# Patient Record
Sex: MALE
[De-identification: names, ages, dates, MRNs, and addresses within clinical notes are randomized per-mention and may not be internally consistent; named-entity substitution may affect disease eponyms.]

---

## 2017-06-21 PROBLEM — Z00.00 ENCOUNTER FOR PREVENTIVE HEALTH EXAMINATION: Status: ACTIVE | Noted: 2017-06-21

## 2017-06-27 ENCOUNTER — APPOINTMENT (OUTPATIENT)
Dept: SURGERY | Facility: CLINIC | Age: 57
End: 2017-06-27

## 2017-08-02 ENCOUNTER — INPATIENT (INPATIENT)
Facility: HOSPITAL | Age: 57
LOS: 1 days | Discharge: HOME | DRG: 473 | End: 2017-08-04
Attending: ORTHOPAEDIC SURGERY | Admitting: ORTHOPAEDIC SURGERY
Payer: MEDICAID

## 2017-08-02 VITALS
HEIGHT: 67 IN | DIASTOLIC BLOOD PRESSURE: 83 MMHG | RESPIRATION RATE: 20 BRPM | WEIGHT: 160.28 LBS | OXYGEN SATURATION: 99 % | HEART RATE: 62 BPM | SYSTOLIC BLOOD PRESSURE: 124 MMHG | TEMPERATURE: 97 F

## 2017-08-02 DIAGNOSIS — Z98.890 OTHER SPECIFIED POSTPROCEDURAL STATES: Chronic | ICD-10-CM

## 2017-08-02 DIAGNOSIS — Z90.49 ACQUIRED ABSENCE OF OTHER SPECIFIED PARTS OF DIGESTIVE TRACT: Chronic | ICD-10-CM

## 2017-08-02 RX ORDER — ACETAMINOPHEN 500 MG
650 TABLET ORAL EVERY 6 HOURS
Qty: 0 | Refills: 0 | Status: DISCONTINUED | OUTPATIENT
Start: 2017-08-02 | End: 2017-08-04

## 2017-08-02 RX ORDER — DOCUSATE SODIUM 100 MG
100 CAPSULE ORAL THREE TIMES A DAY
Qty: 0 | Refills: 0 | Status: DISCONTINUED | OUTPATIENT
Start: 2017-08-02 | End: 2017-08-04

## 2017-08-02 RX ORDER — VANCOMYCIN HCL 1 G
1000 VIAL (EA) INTRAVENOUS ONCE
Qty: 0 | Refills: 0 | Status: COMPLETED | OUTPATIENT
Start: 2017-08-03 | End: 2017-08-03

## 2017-08-02 RX ORDER — HYDROMORPHONE HYDROCHLORIDE 2 MG/ML
1 INJECTION INTRAMUSCULAR; INTRAVENOUS; SUBCUTANEOUS ONCE
Qty: 0 | Refills: 0 | Status: DISCONTINUED | OUTPATIENT
Start: 2017-08-02 | End: 2017-08-03

## 2017-08-02 RX ORDER — SENNA PLUS 8.6 MG/1
2 TABLET ORAL AT BEDTIME
Qty: 0 | Refills: 0 | Status: DISCONTINUED | OUTPATIENT
Start: 2017-08-02 | End: 2017-08-04

## 2017-08-02 RX ORDER — OXYCODONE AND ACETAMINOPHEN 5; 325 MG/1; MG/1
1 TABLET ORAL EVERY 4 HOURS
Qty: 0 | Refills: 0 | Status: DISCONTINUED | OUTPATIENT
Start: 2017-08-02 | End: 2017-08-04

## 2017-08-02 RX ORDER — ONDANSETRON 8 MG/1
4 TABLET, FILM COATED ORAL EVERY 6 HOURS
Qty: 0 | Refills: 0 | Status: DISCONTINUED | OUTPATIENT
Start: 2017-08-02 | End: 2017-08-04

## 2017-08-02 RX ORDER — MORPHINE SULFATE 50 MG/1
4 CAPSULE, EXTENDED RELEASE ORAL EVERY 4 HOURS
Qty: 0 | Refills: 0 | Status: DISCONTINUED | OUTPATIENT
Start: 2017-08-02 | End: 2017-08-03

## 2017-08-02 RX ORDER — MORPHINE SULFATE 50 MG/1
4 CAPSULE, EXTENDED RELEASE ORAL
Qty: 0 | Refills: 0 | Status: DISCONTINUED | OUTPATIENT
Start: 2017-08-02 | End: 2017-08-02

## 2017-08-02 RX ORDER — OXYCODONE AND ACETAMINOPHEN 5; 325 MG/1; MG/1
2 TABLET ORAL EVERY 6 HOURS
Qty: 0 | Refills: 0 | Status: DISCONTINUED | OUTPATIENT
Start: 2017-08-02 | End: 2017-08-04

## 2017-08-02 RX ORDER — SODIUM CHLORIDE 9 MG/ML
1000 INJECTION, SOLUTION INTRAVENOUS
Qty: 0 | Refills: 0 | Status: DISCONTINUED | OUTPATIENT
Start: 2017-08-02 | End: 2017-08-04

## 2017-08-02 RX ADMIN — MORPHINE SULFATE 4 MILLIGRAM(S): 50 CAPSULE, EXTENDED RELEASE ORAL at 23:23

## 2017-08-02 RX ADMIN — MORPHINE SULFATE 4 MILLIGRAM(S): 50 CAPSULE, EXTENDED RELEASE ORAL at 21:56

## 2017-08-02 RX ADMIN — MORPHINE SULFATE 4 MILLIGRAM(S): 50 CAPSULE, EXTENDED RELEASE ORAL at 21:47

## 2017-08-02 RX ADMIN — Medication 100 MILLIGRAM(S): at 23:23

## 2017-08-02 NOTE — ASU PATIENT PROFILE, ADULT - NS PRO AD PATIENT TYPE
Health Care Proxy (HCP)/daughter Leatha Sean [phone] mom basil sean [phone] Health Care Proxy (HCP)/daughter Jeffrey Estrada [phone] mom basil melody [phone]

## 2017-08-03 ENCOUNTER — TRANSCRIPTION ENCOUNTER (OUTPATIENT)
Age: 57
End: 2017-08-03

## 2017-08-03 DIAGNOSIS — M54.12 RADICULOPATHY, CERVICAL REGION: ICD-10-CM

## 2017-08-03 LAB
ANION GAP SERPL CALC-SCNC: 13 MMOL/L — SIGNIFICANT CHANGE UP (ref 5–17)
BASOPHILS NFR BLD AUTO: 0 % — SIGNIFICANT CHANGE UP (ref 0–2)
BUN SERPL-MCNC: 16 MG/DL — SIGNIFICANT CHANGE UP (ref 7–23)
CALCIUM SERPL-MCNC: 8.8 MG/DL — SIGNIFICANT CHANGE UP (ref 8.4–10.5)
CHLORIDE SERPL-SCNC: 99 MMOL/L — SIGNIFICANT CHANGE UP (ref 96–108)
CO2 SERPL-SCNC: 24 MMOL/L — SIGNIFICANT CHANGE UP (ref 22–31)
CREAT SERPL-MCNC: 0.8 MG/DL — SIGNIFICANT CHANGE UP (ref 0.5–1.3)
EOSINOPHIL NFR BLD AUTO: 0 % — SIGNIFICANT CHANGE UP (ref 0–6)
GLUCOSE SERPL-MCNC: 128 MG/DL — HIGH (ref 70–99)
HCT VFR BLD CALC: 36.6 % — LOW (ref 39–50)
HGB BLD-MCNC: 12 G/DL — LOW (ref 13–17)
LYMPHOCYTES # BLD AUTO: 3.4 % — LOW (ref 13–44)
MCHC RBC-ENTMCNC: 30.5 PG — SIGNIFICANT CHANGE UP (ref 27–34)
MCHC RBC-ENTMCNC: 32.8 G/DL — SIGNIFICANT CHANGE UP (ref 32–36)
MCV RBC AUTO: 92.9 FL — SIGNIFICANT CHANGE UP (ref 80–100)
MONOCYTES NFR BLD AUTO: 8.1 % — SIGNIFICANT CHANGE UP (ref 2–14)
NEUTROPHILS NFR BLD AUTO: 88.5 % — HIGH (ref 43–77)
PLATELET # BLD AUTO: 214 K/UL — SIGNIFICANT CHANGE UP (ref 150–400)
POTASSIUM SERPL-MCNC: 4.6 MMOL/L — SIGNIFICANT CHANGE UP (ref 3.5–5.3)
POTASSIUM SERPL-SCNC: 4.6 MMOL/L — SIGNIFICANT CHANGE UP (ref 3.5–5.3)
RBC # BLD: 3.94 M/UL — LOW (ref 4.2–5.8)
RBC # FLD: 14.5 % — SIGNIFICANT CHANGE UP (ref 10.3–16.9)
SODIUM SERPL-SCNC: 136 MMOL/L — SIGNIFICANT CHANGE UP (ref 135–145)
WBC # BLD: 11 K/UL — HIGH (ref 3.8–10.5)
WBC # FLD AUTO: 11 K/UL — HIGH (ref 3.8–10.5)

## 2017-08-03 RX ORDER — FAMOTIDINE 10 MG/ML
20 INJECTION INTRAVENOUS EVERY 12 HOURS
Qty: 0 | Refills: 0 | Status: DISCONTINUED | OUTPATIENT
Start: 2017-08-03 | End: 2017-08-04

## 2017-08-03 RX ORDER — HYDROMORPHONE HYDROCHLORIDE 2 MG/ML
1 INJECTION INTRAMUSCULAR; INTRAVENOUS; SUBCUTANEOUS ONCE
Qty: 0 | Refills: 0 | Status: CANCELLED | OUTPATIENT
Start: 2017-08-10 | End: 2017-08-04

## 2017-08-03 RX ORDER — MORPHINE SULFATE 50 MG/1
2 CAPSULE, EXTENDED RELEASE ORAL EVERY 6 HOURS
Qty: 0 | Refills: 0 | Status: DISCONTINUED | OUTPATIENT
Start: 2017-08-03 | End: 2017-08-04

## 2017-08-03 RX ORDER — DEXAMETHASONE 0.5 MG/5ML
10 ELIXIR ORAL EVERY 12 HOURS
Qty: 0 | Refills: 0 | Status: COMPLETED | OUTPATIENT
Start: 2017-08-03 | End: 2017-08-04

## 2017-08-03 RX ORDER — BENZOCAINE AND MENTHOL 5; 1 G/100ML; G/100ML
1 LIQUID ORAL
Qty: 0 | Refills: 0 | Status: DISCONTINUED | OUTPATIENT
Start: 2017-08-03 | End: 2017-08-04

## 2017-08-03 RX ADMIN — SODIUM CHLORIDE 120 MILLILITER(S): 9 INJECTION, SOLUTION INTRAVENOUS at 10:51

## 2017-08-03 RX ADMIN — FAMOTIDINE 20 MILLIGRAM(S): 10 INJECTION INTRAVENOUS at 05:14

## 2017-08-03 RX ADMIN — Medication 250 MILLIGRAM(S): at 02:35

## 2017-08-03 RX ADMIN — HYDROMORPHONE HYDROCHLORIDE 1 MILLIGRAM(S): 2 INJECTION INTRAMUSCULAR; INTRAVENOUS; SUBCUTANEOUS at 11:26

## 2017-08-03 RX ADMIN — MORPHINE SULFATE 2 MILLIGRAM(S): 50 CAPSULE, EXTENDED RELEASE ORAL at 17:58

## 2017-08-03 RX ADMIN — MORPHINE SULFATE 4 MILLIGRAM(S): 50 CAPSULE, EXTENDED RELEASE ORAL at 03:00

## 2017-08-03 RX ADMIN — MORPHINE SULFATE 102 MILLIGRAM(S): 50 CAPSULE, EXTENDED RELEASE ORAL at 02:42

## 2017-08-03 RX ADMIN — MORPHINE SULFATE 2 MILLIGRAM(S): 50 CAPSULE, EXTENDED RELEASE ORAL at 07:20

## 2017-08-03 RX ADMIN — Medication 102 MILLIGRAM(S): at 16:15

## 2017-08-03 RX ADMIN — HYDROMORPHONE HYDROCHLORIDE 1 MILLIGRAM(S): 2 INJECTION INTRAMUSCULAR; INTRAVENOUS; SUBCUTANEOUS at 12:11

## 2017-08-03 RX ADMIN — SODIUM CHLORIDE 120 MILLILITER(S): 9 INJECTION, SOLUTION INTRAVENOUS at 22:40

## 2017-08-03 RX ADMIN — Medication 100 MILLIGRAM(S): at 13:24

## 2017-08-03 RX ADMIN — MORPHINE SULFATE 2 MILLIGRAM(S): 50 CAPSULE, EXTENDED RELEASE ORAL at 07:35

## 2017-08-03 RX ADMIN — MORPHINE SULFATE 2 MILLIGRAM(S): 50 CAPSULE, EXTENDED RELEASE ORAL at 18:27

## 2017-08-03 NOTE — DISCHARGE NOTE ADULT - HOSPITAL COURSE
Admitted  Surgery 8/3/17: ACDF C3-C4, C6-C7  Esme-op Antibiotics: Vancomycin  Pain control  DVT prophylaxis  OOB/Physical Therapy

## 2017-08-03 NOTE — H&P ADULT - PROBLEM SELECTOR PLAN 1
Admit to Orthopaedic Service.  Elective ACDF C3-C4, C6-C7  Pt medically stable and cleared for procedure today by Admit to Orthopaedic Service.  Elective ACDF C3-C4, C6-C7  Pt medically stable and cleared for procedure by Dr. Antwon Sow MD

## 2017-08-03 NOTE — H&P ADULT - HISTORY OF PRESENT ILLNESS
56M c/o neck pain POD#1 s/p ACDF C3-C4, C6-C7. Pt reports tightness and swelling overlying incision site. Denies numbness, tingling, weakness of extremities. Ambulates without assistance at baseline. Denies DVT history. Denies fever, chills, CP, SOB, N/V today.     Elective ACDF C3-C4, C6-C7.

## 2017-08-03 NOTE — PROVIDER CONTACT NOTE (OTHER) - BACKGROUND
ACDF C3-6. Complaining of neck pain, neck swollen, restless, dressing is saturated with bloody drainage

## 2017-08-03 NOTE — DISCHARGE NOTE ADULT - ADDITIONAL INSTRUCTIONS
No strenuous activity (bending/twisting), heavy lifting, driving or returning to work until cleared by MD.  Change dressing daily with gauze/tape till post-op day #5, then leave incision open to air.  You may shower post-op day#5, keep incision clean and dry.   Try to have regular bowel movements, take stool softener or laxative if necessary.  May take pepcid or zantac for upset stomach.  May apply ice to affected areas to decrease swelling.  Call to schedule an appt with Dr. Mccrary for follow up, if you have staples or sutures they will be removed in office.  Contact your doctor if you experience: fever greater than 101.5, chills, chest pain, difficulty breathing, redness or excessive drainage around the incision, other concerns.     Please follow up with your primary care provider.

## 2017-08-03 NOTE — H&P ADULT - NSHPLABSRESULTS_GEN_ALL_CORE
Preop CBC, CMP, PT/PTT/INR, UA - WNL per medical clearance   Preop EKG - reviewed by per medical clearance

## 2017-08-03 NOTE — PROVIDER CONTACT NOTE (OTHER) - ACTION/TREATMENT ORDERED:
MD Stewart came to assess pt and is aware of the swollen and dressing on neck saturated. Medication to be given for muscle relaxant

## 2017-08-03 NOTE — H&P ADULT - NSHPPHYSICALEXAM_GEN_ALL_CORE
MSK:  +Decreased ROM secondary to pain, cervical spine  Wrist flex/ext intact. Motor intact to AIN/PIN/ulna.  strength intact bilateral UE.  Sensation intact and equal to bilateral UE distally.  Radial pulses palpable, fingers warm and well perfused, cap refill brisk.    Remainder of physical exam per medical clearance note.

## 2017-08-03 NOTE — PROGRESS NOTE ADULT - SUBJECTIVE AND OBJECTIVE BOX
POST OPERATIVE DAY #: 1  STATUS POST: ACDF C3-4, C6-7                        SUBJECTIVE: Patient seen and examined.     Pain:  well controlled      OBJECTIVE:     Vital Signs Last 24 Hrs  T(C): 36.1 (03 Aug 2017 09:32), Max: 36.3 (03 Aug 2017 00:20)  T(F): 97 (03 Aug 2017 09:32), Max: 97.3 (03 Aug 2017 00:20)  HR: 60 (03 Aug 2017 13:15) (54 - 70)  BP: 129/80 (03 Aug 2017 13:15) (102/78 - 146/82)  BP(mean): --  RR: 16 (03 Aug 2017 13:15) (16 - 22)  SpO2: 98% (03 Aug 2017 13:15) (95% - 100%)    PE: Neck: + swelling to anterior neck, no blistering, no erythema, no ecchymosis noted.  Dressing: clean/dry/intact, drain x 1 intact.  b/l UE:         Sensation: intact to light touch to patient's baseline          warm, well-perfused; capillary refill < 3 seconds              I&O's Detail    02 Aug 2017 07:01  -  03 Aug 2017 07:00  --------------------------------------------------------  IN:    lactated ringers.: 480 mL  Total IN: 480 mL    OUT:    Accordian: 30 mL    Bulb: 55 mL    Indwelling Catheter - Urethral: 450 mL  Total OUT: 535 mL    Total NET: -55 mL      03 Aug 2017 07:01  -  03 Aug 2017 14:08  --------------------------------------------------------  IN:    lactated ringers.: 480 mL    Oral Fluid: 320 mL  Total IN: 800 mL    OUT:    Indwelling Catheter - Urethral: 150 mL  Total OUT: 150 mL    Total NET: 650 mL          LABS:                        12.0   11.0  )-----------( 214      ( 03 Aug 2017 07:37 )             36.6     08-03    136  |  99  |  16  ----------------------------<  128<H>  4.6   |  24  |  0.80    Ca    8.8      03 Aug 2017 07:37            MEDICATIONS:    acetaminophen   Tablet 650 milliGRAM(s) Oral every 6 hours PRN  acetaminophen   Tablet. 650 milliGRAM(s) Oral every 6 hours PRN  oxyCODONE    5 mG/acetaminophen 325 mG 1 Tablet(s) Oral every 4 hours PRN  oxyCODONE    5 mG/acetaminophen 325 mG 2 Tablet(s) Oral every 6 hours PRN  ondansetron Injectable 4 milliGRAM(s) IV Push every 6 hours PRN  diazepam  Injectable 5 milliGRAM(s) IV Push every 8 hours PRN  morphine  - Injectable 2 milliGRAM(s) IV Push every 6 hours PRN        RADIOLOGY & ADDITIONAL STUDIES:    ASSESSMENT AND PLAN:     1. Analgesic pain control  2. Decadron 10mg q12 x 2 doses, then 4mg q12 x 6 added for neck swelling per Dr. Mccrary.  3. Advance diet as patient can tolerate.  4. Drain to stay in today.  5. DVT prophylaxis:  SCDs    6. Weight Bearing Status:  Weight bearing as tolerated   7. Disposition: Home, likely Friday.

## 2017-08-03 NOTE — DISCHARGE NOTE ADULT - CARE PLAN
Principal Discharge DX:	Cervical radiculopathy  Goal:	Improvement after surgery  Instructions for follow-up, activity and diet:	See below

## 2017-08-03 NOTE — DISCHARGE NOTE ADULT - CARE PROVIDER_API CALL
Hany Mccrary), Orthopaedic Surgery  [address] [place] [address]  Phone: [phone]  Fax: [phone]

## 2017-08-03 NOTE — DISCHARGE NOTE ADULT - MEDICATION SUMMARY - MEDICATIONS TO TAKE
I will START or STAY ON the medications listed below when I get home from the hospital:    dexamethasone 4 mg oral tablet  -- 1 tab(s) by mouth every 12 hours MDD:2  -- Indication: For steroid    acetaminophen-oxycodone 325 mg-5 mg oral tablet  -- 1 - 2  tab(s) by mouth every 4 - 6  hours, As needed, Moderate Pain -for moderate pain MDD:12  -- Indication: For pain

## 2017-08-03 NOTE — DISCHARGE NOTE ADULT - PATIENT PORTAL LINK FT
“You can access the [facility] Patient Portal, offered by [facility], by registering with the following website: [email or web address]”

## 2017-08-04 VITALS
HEART RATE: 77 BPM | OXYGEN SATURATION: 99 % | SYSTOLIC BLOOD PRESSURE: 137 MMHG | DIASTOLIC BLOOD PRESSURE: 89 MMHG | TEMPERATURE: 98 F | RESPIRATION RATE: 17 BRPM

## 2017-08-04 RX ORDER — DEXAMETHASONE 0.5 MG/5ML
1 ELIXIR ORAL
Qty: 12 | Refills: 0 | OUTPATIENT
Start: 2017-08-04 | End: 2017-08-10

## 2017-08-04 RX ORDER — DEXAMETHASONE 0.5 MG/5ML
4 ELIXIR ORAL EVERY 12 HOURS
Qty: 0 | Refills: 0 | Status: DISCONTINUED | OUTPATIENT
Start: 2017-08-04 | End: 2017-08-04

## 2017-08-04 RX ORDER — OXYCODONE HYDROCHLORIDE 5 MG/1
1 TABLET ORAL
Qty: 60 | Refills: 0 | OUTPATIENT
Start: 2017-08-04

## 2017-08-04 RX ADMIN — OXYCODONE AND ACETAMINOPHEN 2 TABLET(S): 5; 325 TABLET ORAL at 12:23

## 2017-08-04 RX ADMIN — MORPHINE SULFATE 2 MILLIGRAM(S): 50 CAPSULE, EXTENDED RELEASE ORAL at 06:22

## 2017-08-04 RX ADMIN — MORPHINE SULFATE 2 MILLIGRAM(S): 50 CAPSULE, EXTENDED RELEASE ORAL at 00:16

## 2017-08-04 RX ADMIN — Medication 100 MILLIGRAM(S): at 13:31

## 2017-08-04 RX ADMIN — MORPHINE SULFATE 2 MILLIGRAM(S): 50 CAPSULE, EXTENDED RELEASE ORAL at 07:00

## 2017-08-04 RX ADMIN — MORPHINE SULFATE 2 MILLIGRAM(S): 50 CAPSULE, EXTENDED RELEASE ORAL at 01:00

## 2017-08-04 RX ADMIN — Medication 102 MILLIGRAM(S): at 03:40

## 2017-08-04 RX ADMIN — FAMOTIDINE 20 MILLIGRAM(S): 10 INJECTION INTRAVENOUS at 05:21

## 2017-08-04 NOTE — PROGRESS NOTE ADULT - SUBJECTIVE AND OBJECTIVE BOX
POST OPERATIVE DAY #: 1  STATUS POST: ACDF C3-4, C6-7                        SUBJECTIVE: Patient seen and examined.     Pain:  well controlled      OBJECTIVE:     Vital Signs Last 24 Hrs  T(C): 36.8 (04 Aug 2017 04:09), Max: 36.8 (04 Aug 2017 04:09)  T(F): 98.3 (04 Aug 2017 04:09), Max: 98.3 (04 Aug 2017 04:09)  HR: 82 (04 Aug 2017 04:09) (57 - 82)  BP: 149/97 (04 Aug 2017 04:09) (118/78 - 149/97)  BP(mean): --  RR: 18 (04 Aug 2017 04:09) (16 - 18)  SpO2: 99% (04 Aug 2017 04:09) (97% - 100%)    PE: Neck: + swelling to anterior neck, no blistering, no erythema, no ecchymosis noted.  Dressing: clean/dry/intact, drain x 1 intact.  b/l UE:         Sensation: intact to light touch to patient's baseline          warm, well-perfused; capillary refill < 3 seconds                      LABS:                        12.0   11.0  )-----------( 214      ( 03 Aug 2017 07:37 )             36.6     08-03    136  |  99  |  16  ----------------------------<  128<H>  I&O's Detail    02 Aug 2017 07:01  -  03 Aug 2017 07:00  --------------------------------------------------------  IN:    lactated ringers.: 480 mL  Total IN: 480 mL    OUT:    Accordian: 30 mL    Bulb: 55 mL    Indwelling Catheter - Urethral: 450 mL  Total OUT: 535 mL    Total NET: -55 mL      03 Aug 2017 07:01  -  04 Aug 2017 06:22  --------------------------------------------------------  IN:    lactated ringers.: 2400 mL    Oral Fluid: 320 mL  Total IN: 2720 mL    OUT:    Bulb: 47 mL    Indwelling Catheter - Urethral: 150 mL    Voided: 2650 mL  Total OUT: 2847 mL    Total NET: -127 mL      08-03    136  |  99  |  16  ----------------------------<  128<H>  4.6   |  24  |  0.80    Ca    8.8      03 Aug 2017 07:37              MEDICATIONS:    acetaminophen   Tablet 650 milliGRAM(s) Oral every 6 hours PRN  acetaminophen   Tablet. 650 milliGRAM(s) Oral every 6 hours PRN  oxyCODONE    5 mG/acetaminophen 325 mG 1 Tablet(s) Oral every 4 hours PRN  oxyCODONE    5 mG/acetaminophen 325 mG 2 Tablet(s) Oral every 6 hours PRN  ondansetron Injectable 4 milliGRAM(s) IV Push every 6 hours PRN  diazepam  Injectable 5 milliGRAM(s) IV Push every 8 hours PRN  morphine  - Injectable 2 milliGRAM(s) IV Push every 6 hours PRN        RADIOLOGY & ADDITIONAL STUDIES:    ASSESSMENT AND PLAN:     1. Analgesic pain control  2. Decadron 10mg q12 x 2 doses, then 4mg q12 x 6 added for neck swelling per Dr. Mccrary.  3. Advance diet as patient can tolerate.  4. F/U drain output  5. DVT prophylaxis:  SCDs    6. Weight Bearing Status:  Weight bearing as tolerated   7. Disposition: Home, likely Friday pending drain output

## 2017-08-04 NOTE — PROGRESS NOTE ADULT - SUBJECTIVE AND OBJECTIVE BOX
POST OPERATIVE DAY #: 2  STATUS POST: ACDF C3-4, 6-7                       SUBJECTIVE: Patient seen and examined.     Pain:  well controlled      OBJECTIVE:     Vital Signs Last 24 Hrs  T(C): 36.2 (04 Aug 2017 09:34), Max: 36.8 (04 Aug 2017 04:09)  T(F): 97.2 (04 Aug 2017 09:34), Max: 98.3 (04 Aug 2017 04:09)  HR: 72 (04 Aug 2017 09:34) (60 - 82)  BP: 143/88 (04 Aug 2017 09:34) (129/80 - 149/97)  BP(mean): --  RR: 17 (04 Aug 2017 09:34) (16 - 18)  SpO2: 100% (04 Aug 2017 09:34) (98% - 100%)    PE: Neck: swelling to anterior neck improved, no blistering, no erythema, no ecchymosis noted.  Dressing: clean/dry/intact, drain x 1 intact.  b/l UE:         Sensation: intact to light touch to patient's baseline          warm, well-perfused; capillary refill < 3 seconds              I&O's Detail    03 Aug 2017 07:01  -  04 Aug 2017 07:00  --------------------------------------------------------  IN:    lactated ringers.: 2400 mL    Oral Fluid: 320 mL  Total IN: 2720 mL    OUT:    Bulb: 47 mL    Indwelling Catheter - Urethral: 150 mL    Voided: 2650 mL  Total OUT: 2847 mL    Total NET: -127 mL      04 Aug 2017 07:01  -  04 Aug 2017 11:20  --------------------------------------------------------  IN:    lactated ringers.: 240 mL    Oral Fluid: 120 mL  Total IN: 360 mL    OUT:    Voided: 1000 mL  Total OUT: 1000 mL    Total NET: -640 mL          LABS:                        12.0   11.0  )-----------( 214      ( 03 Aug 2017 07:37 )             36.6     08-03    136  |  99  |  16  ----------------------------<  128<H>  4.6   |  24  |  0.80    Ca    8.8      03 Aug 2017 07:37            MEDICATIONS:    acetaminophen   Tablet 650 milliGRAM(s) Oral every 6 hours PRN  acetaminophen   Tablet. 650 milliGRAM(s) Oral every 6 hours PRN  oxyCODONE    5 mG/acetaminophen 325 mG 1 Tablet(s) Oral every 4 hours PRN  oxyCODONE    5 mG/acetaminophen 325 mG 2 Tablet(s) Oral every 6 hours PRN  ondansetron Injectable 4 milliGRAM(s) IV Push every 6 hours PRN  diazepam  Injectable 5 milliGRAM(s) IV Push every 8 hours PRN  morphine  - Injectable 2 milliGRAM(s) IV Push every 6 hours PRN        RADIOLOGY & ADDITIONAL STUDIES:    ASSESSMENT AND PLAN:     1. Analgesic pain control  2. Continue decadron 4mg q12 x 6 for neck swelling per Dr. Mccrary.  3. Advance diet as patient can tolerate.  4. Will d/c drain today.  5. DVT prophylaxis:  SCDs    6. Weight Bearing Status:  Weight bearing as tolerated   7. Disposition: Likely home today

## 2017-08-07 DIAGNOSIS — Z88.0 ALLERGY STATUS TO PENICILLIN: ICD-10-CM

## 2017-08-07 DIAGNOSIS — M54.12 RADICULOPATHY, CERVICAL REGION: ICD-10-CM

## 2017-08-07 DIAGNOSIS — M50.11 CERVICAL DISC DISORDER WITH RADICULOPATHY, HIGH CERVICAL REGION: ICD-10-CM

## 2017-08-14 ENCOUNTER — EMERGENCY (EMERGENCY)
Facility: HOSPITAL | Age: 57
LOS: 1 days | Discharge: HOME | End: 2017-08-14
Attending: EMERGENCY MEDICINE | Admitting: EMERGENCY MEDICINE
Payer: MEDICAID

## 2017-08-14 VITALS
TEMPERATURE: 98 F | DIASTOLIC BLOOD PRESSURE: 63 MMHG | HEART RATE: 66 BPM | WEIGHT: 166.01 LBS | OXYGEN SATURATION: 100 % | RESPIRATION RATE: 16 BRPM | SYSTOLIC BLOOD PRESSURE: 150 MMHG

## 2017-08-14 VITALS
TEMPERATURE: 98 F | OXYGEN SATURATION: 100 % | SYSTOLIC BLOOD PRESSURE: 154 MMHG | RESPIRATION RATE: 16 BRPM | DIASTOLIC BLOOD PRESSURE: 90 MMHG | HEART RATE: 63 BPM

## 2017-08-14 DIAGNOSIS — Z90.49 ACQUIRED ABSENCE OF OTHER SPECIFIED PARTS OF DIGESTIVE TRACT: Chronic | ICD-10-CM

## 2017-08-14 DIAGNOSIS — Z48.01 ENCOUNTER FOR CHANGE OR REMOVAL OF SURGICAL WOUND DRESSING: ICD-10-CM

## 2017-08-14 DIAGNOSIS — Z88.0 ALLERGY STATUS TO PENICILLIN: ICD-10-CM

## 2017-08-14 DIAGNOSIS — L76.82 OTHER POSTPROCEDURAL COMPLICATIONS OF SKIN AND SUBCUTANEOUS TISSUE: ICD-10-CM

## 2017-08-14 DIAGNOSIS — Z98.890 OTHER SPECIFIED POSTPROCEDURAL STATES: Chronic | ICD-10-CM

## 2017-08-14 DIAGNOSIS — M54.2 CERVICALGIA: ICD-10-CM

## 2017-08-14 DIAGNOSIS — Z79.899 OTHER LONG TERM (CURRENT) DRUG THERAPY: ICD-10-CM

## 2017-08-14 DIAGNOSIS — Z79.891 LONG TERM (CURRENT) USE OF OPIATE ANALGESIC: ICD-10-CM

## 2017-08-14 LAB
ANION GAP SERPL CALC-SCNC: 9 MMOL/L — SIGNIFICANT CHANGE UP (ref 5–17)
ANISOCYTOSIS BLD QL: SLIGHT — SIGNIFICANT CHANGE UP
APTT BLD: 28.4 SEC — SIGNIFICANT CHANGE UP (ref 27.5–37.4)
BASOPHILS NFR BLD AUTO: 1 % — SIGNIFICANT CHANGE UP (ref 0–2)
BUN SERPL-MCNC: 17 MG/DL — SIGNIFICANT CHANGE UP (ref 7–23)
CALCIUM SERPL-MCNC: 9.2 MG/DL — SIGNIFICANT CHANGE UP (ref 8.4–10.5)
CHLORIDE SERPL-SCNC: 103 MMOL/L — SIGNIFICANT CHANGE UP (ref 96–108)
CO2 SERPL-SCNC: 27 MMOL/L — SIGNIFICANT CHANGE UP (ref 22–31)
CREAT SERPL-MCNC: 0.9 MG/DL — SIGNIFICANT CHANGE UP (ref 0.5–1.3)
CRP SERPL-MCNC: 0.4 MG/DL — SIGNIFICANT CHANGE UP (ref 0–0.4)
EOSINOPHIL NFR BLD AUTO: 7 % — HIGH (ref 0–6)
ERYTHROCYTE [SEDIMENTATION RATE] IN BLOOD: 19 MM/HR — SIGNIFICANT CHANGE UP
GIANT PLATELETS BLD QL SMEAR: PRESENT — SIGNIFICANT CHANGE UP
GLUCOSE SERPL-MCNC: 106 MG/DL — HIGH (ref 70–99)
HCT VFR BLD CALC: 34.2 % — LOW (ref 39–50)
HGB BLD-MCNC: 11.3 G/DL — LOW (ref 13–17)
INR BLD: 1.01 — SIGNIFICANT CHANGE UP (ref 0.88–1.16)
LG PLATELETS BLD QL AUTO: PRESENT — SIGNIFICANT CHANGE UP
LYMPHOCYTES # BLD AUTO: 21 % — SIGNIFICANT CHANGE UP (ref 13–44)
MACROCYTES BLD QL: SLIGHT — SIGNIFICANT CHANGE UP
MANUAL DIF COMMENT BLD-IMP: SIGNIFICANT CHANGE UP
MANUAL SMEAR VERIFICATION: SIGNIFICANT CHANGE UP
MCHC RBC-ENTMCNC: 30.7 PG — SIGNIFICANT CHANGE UP (ref 27–34)
MCHC RBC-ENTMCNC: 33 G/DL — SIGNIFICANT CHANGE UP (ref 32–36)
MCV RBC AUTO: 92.9 FL — SIGNIFICANT CHANGE UP (ref 80–100)
METAMYELOCYTES # FLD: 1 % — HIGH
MONOCYTES NFR BLD AUTO: 7 % — SIGNIFICANT CHANGE UP (ref 2–14)
NEUTROPHILS NFR BLD AUTO: 63 % — SIGNIFICANT CHANGE UP (ref 43–77)
PLAT MORPH BLD: (no result)
PLATELET # BLD AUTO: 353 K/UL — SIGNIFICANT CHANGE UP (ref 150–400)
POIKILOCYTOSIS BLD QL AUTO: SLIGHT — SIGNIFICANT CHANGE UP
POLYCHROMASIA BLD QL SMEAR: SLIGHT — SIGNIFICANT CHANGE UP
POTASSIUM SERPL-MCNC: 5 MMOL/L — SIGNIFICANT CHANGE UP (ref 3.5–5.3)
POTASSIUM SERPL-SCNC: 5 MMOL/L — SIGNIFICANT CHANGE UP (ref 3.5–5.3)
PROTHROM AB SERPL-ACNC: 11.2 SEC — SIGNIFICANT CHANGE UP (ref 9.8–12.7)
RBC # BLD: 3.68 M/UL — LOW (ref 4.2–5.8)
RBC # FLD: 14.7 % — SIGNIFICANT CHANGE UP (ref 10.3–16.9)
RBC BLD AUTO: (no result)
SODIUM SERPL-SCNC: 139 MMOL/L — SIGNIFICANT CHANGE UP (ref 135–145)
SPHEROCYTES BLD QL SMEAR: SLIGHT — SIGNIFICANT CHANGE UP
WBC # BLD: 5.9 K/UL — SIGNIFICANT CHANGE UP (ref 3.8–10.5)
WBC # FLD AUTO: 5.9 K/UL — SIGNIFICANT CHANGE UP (ref 3.8–10.5)

## 2017-08-14 PROCEDURE — 85652 RBC SED RATE AUTOMATED: CPT

## 2017-08-14 PROCEDURE — 86140 C-REACTIVE PROTEIN: CPT

## 2017-08-14 PROCEDURE — 80048 BASIC METABOLIC PNL TOTAL CA: CPT

## 2017-08-14 PROCEDURE — 85025 COMPLETE CBC W/AUTO DIFF WBC: CPT

## 2017-08-14 PROCEDURE — 85610 PROTHROMBIN TIME: CPT

## 2017-08-14 PROCEDURE — 85730 THROMBOPLASTIN TIME PARTIAL: CPT

## 2017-08-14 PROCEDURE — 99284 EMERGENCY DEPT VISIT MOD MDM: CPT | Mod: 25

## 2017-08-14 PROCEDURE — 99284 EMERGENCY DEPT VISIT MOD MDM: CPT

## 2017-08-14 NOTE — ED PROVIDER NOTE - ENMT, MLM
Airway patent, Nasal mucosa clear. Mouth with normal mucosa.  Neck wound with dressing in place with blood visible soaking through gauze.  No pulsatile mass.  Posterior pharynx normal.

## 2017-08-14 NOTE — ED PROVIDER NOTE - OBJECTIVE STATEMENT
here with bleeding from post surgical wound.  States he woke this am with mild swelling and bloody discharge from wound.  Had anterior cervical disc fusion 8/2.  Denies trouble breathing/swallowing, pain, fever/chills. [place]ian translation via pacific : here with bleeding from post surgical wound.  States he woke this am with mild swelling and bloody discharge from wound.  Had anterior cervical disc fusion 8/2.  Denies trouble breathing/swallowing, pain, fever/chills.

## 2017-08-14 NOTE — ED ADULT TRIAGE NOTE - OTHER COMPLAINTS
pt had cervical spine surgery last week and started bleeding from anterior neck surgical site this am, dressing with some brown blood noted, pt speaking clearly in full sentences, unlabored breathing. Pt denies fever/chills

## 2017-08-14 NOTE — ED ADULT NURSE NOTE - OBJECTIVE STATEMENT
Patient to ED alert and orientedx3, s/p surgery for herniated disc involving left neck region. Surgery was performed on 8/2. As per patient "They placed a drain for my wound and then removed it on 8/4 right before I was discharged. The wound was not stitched, just covered with gauze. The doctor said it wasn't necessary to stitch it up. This morning, I woke up and there was blood coming from the wound at 530a. My wife and I decided to come to the ER to check it out." Patient denies lightheadedness, dizziness, changes in mental status, fever, chills, or purulent drainage from wound site. Presents to ED with soiled wound covered with gauze and accompanied by wife. [place] language translation services provided by  # [id number]. Patient speaking in clear, full sentences. Airway patent.

## 2017-08-14 NOTE — ED PROVIDER NOTE - MEDICAL DECISION MAKING DETAILS
post op wound check with min bleeding.  seen by orthopedics.  dressing changed. no active bleeding.  to f/u with ortho outpatient

## 2017-08-14 NOTE — CONSULT NOTE ADULT - SUBJECTIVE AND OBJECTIVE BOX
Pt Name: ZOFIA CROCKETT  MRN: [id number]      Pt is a 57yo Male with post op bleeding. Pt. states he had an ACDF performed on 8/2/17 with Dr. Mccrary and discharge 8/4/17. Pt. states he woke up this am and noticed bleeding from his wound. Pt. states he put a bandaid and dressing over it but it still was bleeding through. Pt. reports calling ortho md and was told to come into the ER. Pt. states it is coming from the drain site.  Pt. denies any injury, sharp twisting of neck to cause bleeding. Pt. states he started to take wifes Ibuprofen for pain after surgery and stopped a few days ago. Pt. was discharged with taking percocet post operatively and not Ibuprofen. Pt. denies any numbness/tingling in b/l ues. Denies any difficulty with swallowing.       Herniated disc, cervical  Neck pain  History of appendectomy  History of surgery  POST OP COMPLICATION        ROS is otherwise negative.    PAST MEDICAL & SURGICAL HISTORY:  Herniated disc, cervical  Neck pain  History of appendectomy: 7 y.o  History of surgery: r shoulder surgery d/t s/p ceiling falling down      Allergiespenicillin (Rash)      Social History:  Ambulation: Walking independently [x ] With Cane [ ] With Walker [ ]  Bedbound [ ]     PHYSICAL EXAM:    T(C): 36.9 (08-14-17 @ 13:18), Max: 36.9 (08-14-17 @ 13:18)  HR: 66 (08-14-17 @ 13:18) (66 - 66)  BP: 150/63 (08-14-17 @ 13:18) (150/63 - 150/63)  RR: 16 (08-14-17 @ 13:18) (16 - 16)  SpO2: 100% (08-14-17 @ 13:18) (100% - 100%)  Wt(kg): --    Gen: NAD,   Neurological: no focal deficits  Skin: Saturated dressing on anterior neck region. Neck- steris in place and drain site covered with bandaid (from patient) in place, No active bleeding at this time, but drain site not closed. No active bleeding from incision site.   Musculoskeletal: slt intact,  brachial/radial pulses 2+, biceps/triceps/delts, , finger int 5/5 b/l ues  ROM: Neck- not tested secondary to surgery but       A/P:  Pt is a 57yo Male  with post operative bleeding from drain site 2/2 to Ibuprofen usage  -D/W Dr. Mccrary  -dressing taken down,  and replaced with pressure dressing (gauze and tegaderm)  -pain control with Percocet  - Pt. instructed not to take wifes medication only medication prescribed to him (percocet). Pt. explained not to take Ibuprofen anymore and reason why bleeding occurred 2/2 to Ibuprofen usage.   -f/u with Dr. Mccrary this week for post op wound check Pt Name: ZOFIA CROCKETT  MRN: [id number]      Pt is a 57yo Male with post op bleeding. Pt. states he had an ACDF C3-C4, C6-C7 performed on 8/2/17 with Dr. Mccrary and discharged 8/4/17. Pt. states he woke up this am and noticed bleeding from his wound. Pt. states he put a bandaid and dressing over it but it still was bleeding through. Pt. reports calling ortho md and was told to come into the ER. Pt. states it is coming from the drain site.  Pt. denies any injury, sharp twisting of neck to cause bleeding. Pt. states he started to take wifes Ibuprofen for pain after surgery and stopped a few days ago. Pt. was discharged with taking percocet post operatively and not Ibuprofen. Pt. denies any numbness/tingling in b/l ues. Denies any difficulty with swallowing.       Herniated disc, cervical  Neck pain  History of appendectomy  History of surgery  POST OP COMPLICATION        ROS is otherwise negative.    PAST MEDICAL & SURGICAL HISTORY:  Herniated disc, cervical  Neck pain  History of appendectomy: 7 y.o  History of surgery: r shoulder surgery d/t s/p ceiling falling down      Allergiespenicillin (Rash)      Social History:  Ambulation: Walking independently [x ] With Cane [ ] With Walker [ ]  Bedbound [ ]     PHYSICAL EXAM:    T(C): 36.9 (08-14-17 @ 13:18), Max: 36.9 (08-14-17 @ 13:18)  HR: 66 (08-14-17 @ 13:18) (66 - 66)  BP: 150/63 (08-14-17 @ 13:18) (150/63 - 150/63)  RR: 16 (08-14-17 @ 13:18) (16 - 16)  SpO2: 100% (08-14-17 @ 13:18) (100% - 100%)  Wt(kg): --    Gen: NAD,   Neurological: no focal deficits  Skin: Saturated dressing on anterior neck region. Neck- steris in place and drain site covered with bandaid (from patient) in place, No active bleeding at this time, but drain site not closed. No active bleeding from incision site.   Musculoskeletal: slt intact,  brachial/radial pulses 2+, biceps/triceps/delts, , finger int 5/5 b/l ues  ROM: Neck- not tested secondary to surgery      A/P:  Pt is a 57yo Male  with post operative bleeding from drain site 2/2 to Ibuprofen usage  -D/W Dr. Mccrary  -dressing taken down,  and replaced with pressure dressing (gauze and tegaderm). Dressing dry ~30min to application  -pain control with Percocet  - Pt. instructed not to take wifes medication only medication prescribed to him (percocet). Pt. explained not to take Ibuprofen anymore and reason why bleeding occurred 2/2 to Ibuprofen usage.   -f/u with Dr. Mccrary this week for post op wound check

## 2017-08-16 PROCEDURE — 85025 COMPLETE CBC W/AUTO DIFF WBC: CPT

## 2017-08-16 PROCEDURE — C1889: CPT

## 2017-08-16 PROCEDURE — 86901 BLOOD TYPING SEROLOGIC RH(D): CPT

## 2017-08-16 PROCEDURE — 86900 BLOOD TYPING SEROLOGIC ABO: CPT

## 2017-08-16 PROCEDURE — 86850 RBC ANTIBODY SCREEN: CPT

## 2017-08-16 PROCEDURE — 36415 COLL VENOUS BLD VENIPUNCTURE: CPT

## 2017-08-16 PROCEDURE — 95940 IONM IN OPERATNG ROOM 15 MIN: CPT

## 2017-08-16 PROCEDURE — C1713: CPT

## 2017-08-16 PROCEDURE — 80048 BASIC METABOLIC PNL TOTAL CA: CPT

## 2017-08-16 PROCEDURE — 76000 FLUOROSCOPY <1 HR PHYS/QHP: CPT

## 2019-06-06 NOTE — ASU PREOP CHECKLIST - PATIENT PROBLEMS/NEEDS
Anterior Cervical Dissectomy , fusion c3 -c4 c6-c7 Consent (Lip)/Introductory Paragraph: The rationale for Mohs was explained to the patient and consent was obtained. The risks, benefits and alternatives to therapy were discussed in detail. Specifically, the risks of lip deformity, changes in the oral aperture, infection, scarring, bleeding, prolonged wound healing, incomplete removal, allergy to anesthesia, nerve injury and recurrence were addressed. Prior to the procedure, the treatment site was clearly identified and confirmed by the patient. All components of Universal Protocol/PAUSE Rule completed.

## 2022-12-31 NOTE — H&P ADULT - PMH
OFFICE VISIT      Patient: Moy Ford Date of Service: 2022   : 1993 MRN: [id number]     SUBJECTIVE:   HISTORY OF PRESENT ILLNESS:  Moy Ford is a 29 year old male who presents today for cough, congestion for the past couple days.  Friend positive for Flu A      PAST MEDICAL HISTORY:  Past Medical History:   Diagnosis Date   • Chicken pox    • Lateral epicondylitis 2019       MEDICATIONS:  Current Outpatient Medications   Medication Sig   • diazePAM (Valium) 5 MG tablet Take 1 tablet by mouth every 8 hours as needed for Muscle spasms (back pain).   • triamcinolone (ARISTOCORT) 0.1 % cream      No current facility-administered medications for this visit.       ALLERGIES:  ALLERGIES:  No Known Allergies    PAST SURGICAL HISTORY:  Past Surgical History:   Procedure Laterality Date   • No past surgeries         FAMILY HISTORY:  Family History   Problem Relation Age of Onset   • Patient is unaware of any medical problems Mother    • Patient is unaware of any medical problems Father    • Cancer Paternal Grandmother    • Cancer, Lung Paternal Grandfather         Smoker       SOCIAL HISTORY:  Social History     Tobacco Use   • Smoking status: Never   • Smokeless tobacco: Never   Vaping Use   • Vaping Use: never used   Substance Use Topics   • Alcohol use: Yes     Comment: occassional   • Drug use: No       Review of Systems   Constitutional: Positive for activity change, appetite change, chills and fatigue. Negative for fever.   HENT: Positive for congestion, postnasal drip and sore throat. Negative for drooling, facial swelling, sinus pressure, sinus pain, trouble swallowing and voice change.    Respiratory: Positive for cough and shortness of breath. Negative for chest tightness and wheezing.    Cardiovascular: Negative for chest pain.   Gastrointestinal: Negative for abdominal pain, constipation, diarrhea, nausea and vomiting.   Genitourinary: Negative for flank pain, frequency and  urgency.   Musculoskeletal: Negative for back pain, joint swelling, neck pain and neck stiffness.   Allergic/Immunologic: Negative for environmental allergies and food allergies.   Neurological: Negative for dizziness, speech difficulty, weakness and light-headedness.         OBJECTIVE:     Physical Exam  Vitals and nursing note reviewed.   Constitutional:       Appearance: Normal appearance. He is well-developed.   HENT:      Head: Normocephalic and atraumatic.      Right Ear: A middle ear effusion is present.      Left Ear: A middle ear effusion is present.      Nose: Mucosal edema, congestion and rhinorrhea present. Rhinorrhea is clear.      Right Turbinates: Swollen.      Left Turbinates: Swollen.      Mouth/Throat:      Mouth: Mucous membranes are moist.      Pharynx: Oropharyngeal exudate present.      Tonsils: Tonsillar exudate present. 1+ on the right. 1+ on the left.   Eyes:      Pupils: Pupils are equal, round, and reactive to light.   Cardiovascular:      Rate and Rhythm: Normal rate and regular rhythm.   Pulmonary:      Effort: Pulmonary effort is normal.      Breath sounds: Examination of the right-upper field reveals rhonchi. Examination of the left-upper field reveals rhonchi. Examination of the right-lower field reveals decreased breath sounds. Examination of the left-lower field reveals decreased breath sounds. Decreased breath sounds and rhonchi present.   Abdominal:      General: Bowel sounds are normal.      Palpations: Abdomen is soft.   Musculoskeletal:         General: Normal range of motion.      Cervical back: Normal range of motion and neck supple.   Skin:     General: Skin is warm and dry.   Neurological:      Mental Status: He is alert and oriented to person, place, and time.         There were no vitals taken for this visit.    DIAGNOSTIC STUDIES:   LAB RESULTS:    Please enter a base name.    Assessment AND PLAN:   This is a 29 year old year-old male who presents with cough, congestion  for the past couple days.  Friend positive for Flu A.    No diagnosis found.    No follow-ups on file.    Instructions provided as documented in the AVS.          The Patient indicated understanding of the diagnosis and agreed with the plan of care.        Marjorie Herbert, PRAFUL, FNP-BC   Neck pain

## 2023-07-06 NOTE — ASU PATIENT PROFILE, ADULT - NS SC CAGE ALCOHOL ANNOYED YOU
Chief complaint:   Chief Complaint   Patient presents with   • Derm Problem     EST 4       Vitals:  Visit Vitals  /84   Pulse (!) 58   Temp 97.6 °F (36.4 °C)   Resp 16   Ht 5' 1\" (1.549 m)   Wt 50.3 kg (111 lb)   SpO2 98%   BMI 20.97 kg/m²       HISTORY OF PRESENT ILLNESS     HPI  Increased redness and dry skin surrounding her nail beds for the past several weeks. Pt was evaluated last week for concerns of infection of her right thumb at that time only. Pt does have manicures performed professionally.   6/25/2023 pt was diagnosed with a paronychia. Oral Antibiotic therapy was not indicated at that time related to size of infection and duration. Pt has been soaking in warm, soapy water. Pt admits to picking at her nail beds recently because of the itchiness and dryness. This is unusual for her. Pt states she has had her manicurist for many years and believes the salon to be very clean.     No other concerns.    Other significant problems:  Patient Active Problem List    Diagnosis Date Noted   • Osteopenia of left hip 11/20/2019     Priority: Low   • Pure hypercholesterolemia 11/20/2019     Priority: Low   • Chronic insomnia 11/20/2019     Priority: Low       PAST MEDICAL, FAMILY AND SOCIAL HISTORY     Medications:  Current Outpatient Medications   Medication Sig Dispense Refill   • amoxicillin-clavulanate (AUGMENTIN) 875-125 MG per tablet Take 1 tablet by mouth in the morning and 1 tablet in the evening. Do all this for 10 days. 20 tablet 0   • Zinc 50 MG Tab Take 50 mg by mouth.     • ascorbic acid (VITAMIN C) 100 MG Tab Take 100 mg by mouth daily.     • zolpidem (AMBIEN) 10 MG tablet Take 0.5-1 tablets by mouth nightly as needed for Sleep. 30 tablet 2   • ezetimibe (ZETIA) 10 MG tablet Take 1 tablet by mouth daily. 90 tablet 3   • naproxen sodium (ALEVE) 220 MG tablet Take 220 mg by mouth 4 times daily as needed.       No current facility-administered medications for this visit.       Allergies:  ALLERGIES:    Allergen Reactions   • Atorvastatin Other (See Comments)     Muscle cramping   • Sulfa Antibiotics RASH       Past Medical  History/Surgeries:  Past Medical History:   Diagnosis Date   • Chronic insomnia    • HLD (hyperlipidemia)    • IBS (irritable bowel syndrome)        Past Surgical History:   Procedure Laterality Date   • Colonoscopy  02/05/2004       Family History:  Family History   Problem Relation Age of Onset   • Dementia/Alzheimers Mother    • Heart disease Father    • Dementia/Alzheimers Sister         frontal lobe dementia   • Patient is unaware of any medical problems Daughter    • Patient is unaware of any medical problems Maternal Grandmother    • Patient is unaware of any medical problems Maternal Grandfather    • Patient is unaware of any medical problems Paternal Grandmother    • Patient is unaware of any medical problems Paternal Grandfather    • Patient is unaware of any medical problems Daughter        Social History:  Social History     Tobacco Use   • Smoking status: Never     Passive exposure: Never   • Smokeless tobacco: Never   Substance Use Topics   • Alcohol use: Yes     Alcohol/week: 0.0 standard drinks of alcohol     Comment: glass of wine 2-4 times per week       REVIEW OF SYSTEMS     Review of Systems  See HPI  PHYSICAL EXAM     Physical Exam  Vitals and nursing note reviewed.   Constitutional:       General: She is not in acute distress.     Appearance: Normal appearance. She is not ill-appearing or toxic-appearing.   Cardiovascular:      Rate and Rhythm: Normal rate.      Pulses: Normal pulses.   Pulmonary:      Effort: Pulmonary effort is normal.   Musculoskeletal:         General: Normal range of motion.   Skin:     General: Skin is warm and dry.      Comments: Dry skin with redness surrounding multiple nail beds , bilaterally.  No drainage. Concerns of fungus versus bacterial etiology at this time.    Neurological:      General: No focal deficit present.      Mental Status: She  is alert and oriented to person, place, and time.      Motor: No weakness.      Coordination: Coordination normal.      Gait: Gait normal.   Psychiatric:         Mood and Affect: Mood normal.         Thought Content: Thought content normal.         Judgment: Judgment normal.         ASSESSMENT/PLAN     Encounter Diagnoses   Name Primary?   • Paronychia of finger, unspecified laterality Yes     Rx for Augmentin, take with food. I will begin treatment with antibiotics to treat for possible bacterial infection. Concerns this may be fungal related. Pt has a dermatologist to follow up with.   Avoid picking at nail beds.  Apply vitamin e oil to nail beds and surrounding tissue.  Close follow up with PCP if without improvement, may consider a derm consult as well at that time. Remove gel from nails to promote healing and nail growth.   All questions addressed     no

## 2024-08-13 ENCOUNTER — EMERGENCY (EMERGENCY)
Facility: HOSPITAL | Age: 64
LOS: 1 days | Discharge: HOME | End: 2024-08-13
Attending: STUDENT IN AN ORGANIZED HEALTH CARE EDUCATION/TRAINING PROGRAM
Payer: MEDICAID

## 2024-08-13 VITALS
OXYGEN SATURATION: 97 % | HEIGHT: 67 IN | HEART RATE: 69 BPM | WEIGHT: 172.84 LBS | SYSTOLIC BLOOD PRESSURE: 162 MMHG | RESPIRATION RATE: 16 BRPM | DIASTOLIC BLOOD PRESSURE: 101 MMHG | TEMPERATURE: 98 F

## 2024-08-13 PROCEDURE — 99285 EMERGENCY DEPT VISIT HI MDM: CPT

## 2024-08-13 PROCEDURE — 99053 MED SERV 10PM-8AM 24 HR FAC: CPT

## 2024-08-13 NOTE — ED ADULT NURSE NOTE - OBJECTIVE STATEMENT
Pt present to the Ed with c/o upper tooth pain and upper lip swelling since yesterday . No acute distress noted at this time

## 2024-08-14 VITALS
OXYGEN SATURATION: 98 % | RESPIRATION RATE: 17 BRPM | SYSTOLIC BLOOD PRESSURE: 167 MMHG | HEART RATE: 66 BPM | TEMPERATURE: 99 F | DIASTOLIC BLOOD PRESSURE: 101 MMHG

## 2024-08-14 LAB
ALBUMIN SERPL ELPH-MCNC: 4.3 G/DL — SIGNIFICANT CHANGE UP (ref 3.5–5)
ALP SERPL-CCNC: 69 U/L — SIGNIFICANT CHANGE UP (ref 40–120)
ALT FLD-CCNC: 24 U/L DA — SIGNIFICANT CHANGE UP (ref 10–60)
ANION GAP SERPL CALC-SCNC: 4 MMOL/L — LOW (ref 5–17)
AST SERPL-CCNC: 8 U/L — LOW (ref 10–40)
BASOPHILS # BLD AUTO: 0.05 K/UL — SIGNIFICANT CHANGE UP (ref 0–0.2)
BASOPHILS NFR BLD AUTO: 0.6 % — SIGNIFICANT CHANGE UP (ref 0–2)
BILIRUB SERPL-MCNC: 0.5 MG/DL — SIGNIFICANT CHANGE UP (ref 0.2–1.2)
BUN SERPL-MCNC: 13 MG/DL — SIGNIFICANT CHANGE UP (ref 7–18)
CALCIUM SERPL-MCNC: 9.4 MG/DL — SIGNIFICANT CHANGE UP (ref 8.4–10.5)
CHLORIDE SERPL-SCNC: 107 MMOL/L — SIGNIFICANT CHANGE UP (ref 96–108)
CO2 SERPL-SCNC: 27 MMOL/L — SIGNIFICANT CHANGE UP (ref 22–31)
CREAT SERPL-MCNC: 1.02 MG/DL — SIGNIFICANT CHANGE UP (ref 0.5–1.3)
EGFR: 83 ML/MIN/1.73M2 — SIGNIFICANT CHANGE UP
EOSINOPHIL # BLD AUTO: 0.1 K/UL — SIGNIFICANT CHANGE UP (ref 0–0.5)
EOSINOPHIL NFR BLD AUTO: 1.2 % — SIGNIFICANT CHANGE UP (ref 0–6)
GLUCOSE SERPL-MCNC: 100 MG/DL — HIGH (ref 70–99)
HCT VFR BLD CALC: 44 % — SIGNIFICANT CHANGE UP (ref 39–50)
HGB BLD-MCNC: 14.4 G/DL — SIGNIFICANT CHANGE UP (ref 13–17)
IMM GRANULOCYTES NFR BLD AUTO: 0.3 % — SIGNIFICANT CHANGE UP (ref 0–0.9)
LYMPHOCYTES # BLD AUTO: 1.74 K/UL — SIGNIFICANT CHANGE UP (ref 1–3.3)
LYMPHOCYTES # BLD AUTO: 20.2 % — SIGNIFICANT CHANGE UP (ref 13–44)
MCHC RBC-ENTMCNC: 30.7 PG — SIGNIFICANT CHANGE UP (ref 27–34)
MCHC RBC-ENTMCNC: 32.7 GM/DL — SIGNIFICANT CHANGE UP (ref 32–36)
MCV RBC AUTO: 93.8 FL — SIGNIFICANT CHANGE UP (ref 80–100)
MONOCYTES # BLD AUTO: 0.88 K/UL — SIGNIFICANT CHANGE UP (ref 0–0.9)
MONOCYTES NFR BLD AUTO: 10.2 % — SIGNIFICANT CHANGE UP (ref 2–14)
NEUTROPHILS # BLD AUTO: 5.8 K/UL — SIGNIFICANT CHANGE UP (ref 1.8–7.4)
NEUTROPHILS NFR BLD AUTO: 67.5 % — SIGNIFICANT CHANGE UP (ref 43–77)
NRBC # BLD: 0 /100 WBCS — SIGNIFICANT CHANGE UP (ref 0–0)
PLATELET # BLD AUTO: 223 K/UL — SIGNIFICANT CHANGE UP (ref 150–400)
POTASSIUM SERPL-MCNC: 4.8 MMOL/L — SIGNIFICANT CHANGE UP (ref 3.5–5.3)
POTASSIUM SERPL-SCNC: 4.8 MMOL/L — SIGNIFICANT CHANGE UP (ref 3.5–5.3)
PROT SERPL-MCNC: 8.1 G/DL — SIGNIFICANT CHANGE UP (ref 6–8.3)
RBC # BLD: 4.69 M/UL — SIGNIFICANT CHANGE UP (ref 4.2–5.8)
RBC # FLD: 13.9 % — SIGNIFICANT CHANGE UP (ref 10.3–14.5)
SODIUM SERPL-SCNC: 138 MMOL/L — SIGNIFICANT CHANGE UP (ref 135–145)
WBC # BLD: 8.6 K/UL — SIGNIFICANT CHANGE UP (ref 3.8–10.5)
WBC # FLD AUTO: 8.6 K/UL — SIGNIFICANT CHANGE UP (ref 3.8–10.5)

## 2024-08-14 PROCEDURE — 80053 COMPREHEN METABOLIC PANEL: CPT

## 2024-08-14 PROCEDURE — 70487 CT MAXILLOFACIAL W/DYE: CPT | Mod: MC

## 2024-08-14 PROCEDURE — 96374 THER/PROPH/DIAG INJ IV PUSH: CPT

## 2024-08-14 PROCEDURE — 36415 COLL VENOUS BLD VENIPUNCTURE: CPT

## 2024-08-14 PROCEDURE — 96375 TX/PRO/DX INJ NEW DRUG ADDON: CPT

## 2024-08-14 PROCEDURE — 70487 CT MAXILLOFACIAL W/DYE: CPT | Mod: 26,MC

## 2024-08-14 PROCEDURE — 85025 COMPLETE CBC W/AUTO DIFF WBC: CPT

## 2024-08-14 PROCEDURE — 99284 EMERGENCY DEPT VISIT MOD MDM: CPT | Mod: 25

## 2024-08-14 RX ORDER — CLINDAMYCIN PHOSPHATE 150 MG/ML
1 VIAL (ML) INJECTION
Qty: 28 | Refills: 0
Start: 2024-08-14 | End: 2024-08-20

## 2024-08-14 RX ORDER — CLINDAMYCIN PHOSPHATE 150 MG/ML
600 VIAL (ML) INJECTION ONCE
Refills: 0 | Status: COMPLETED | OUTPATIENT
Start: 2024-08-14 | End: 2024-08-14

## 2024-08-14 RX ORDER — KETOROLAC TROMETHAMINE 10 MG
15 TABLET ORAL ONCE
Refills: 0 | Status: DISCONTINUED | OUTPATIENT
Start: 2024-08-14 | End: 2024-08-14

## 2024-08-14 RX ORDER — BACTERIOSTATIC SODIUM CHLORIDE 0.9 %
1000 VIAL (ML) INJECTION ONCE
Refills: 0 | Status: COMPLETED | OUTPATIENT
Start: 2024-08-14 | End: 2024-08-14

## 2024-08-14 RX ADMIN — Medication 1000 MILLILITER(S): at 00:18

## 2024-08-14 RX ADMIN — Medication 100 MILLIGRAM(S): at 00:56

## 2024-08-14 RX ADMIN — Medication 15 MILLIGRAM(S): at 00:18

## 2024-08-14 NOTE — ED PROVIDER NOTE - CLINICAL SUMMARY MEDICAL DECISION MAKING FREE TEXT BOX
Garrett Nolan, DO: 63 YOM, no reported PMH, PW lip swelling.  PW wife bedside provides collateral translation, official  declined.  Reports 2 days of symptoms.   Reports symptoms began yesterday and getting worse today.  Reports pain and swelling to right upper lip, no prior history of similar symptoms.  Denies recent trauma, dental procedure.  Denies increased elevation, drooling, F/C, N/V, cough, congestion.  Patient arrives HDS, well-appearing, speaking full sentences, tolerating secretions.  No evidence of airway compromise upon initial evaluation.  Eval for collection and/or infection.  Labs, CT, reassess.  Patient to be signed out pending reassessment and workup.

## 2024-08-14 NOTE — ED PROVIDER NOTE - PATIENT PORTAL LINK FT
You can access the FollowMyHealth Patient Portal offered by [facility] by registering at the following website: [email or web address]. By joining [employer]’s FollowMyHealth portal, you will also be able to view your health information using other applications (apps) compatible with our system.

## 2024-08-14 NOTE — ED PROVIDER NOTE - PHYSICAL EXAMINATION
General: nad   HEENT: EOMI, PERRLA, normal mucosa, normal oropharynx, no lesions on the lips or on oral mucosa, normal external ear, r upper lip swelling right aspect worse than left  mouth: Multiple dental caries, uvula midline, no tongue elevation, Gingivitis  Neck: supple, no lymphadenopathy, full range of motion, no nuchal rigidity  CV: well perfused   Resp:non labored breathing   MSK: no deformities   Neuro: CN II-XII grossly intact

## 2024-08-14 NOTE — ED PROVIDER NOTE - NSFOLLOWUPINSTRUCTIONS_ED_ALL_ED_FT
A dental abscess is an area of pus in or around a tooth. It comes from an infection. It can cause pain and other symptoms. Treatment will help with symptoms and prevent the infection from spreading.    What are the causes?  This condition is caused by an infection in or around the tooth. This can be from:  Very bad tooth decay (cavities).  A bad injury to the tooth, such as a broken or chipped tooth.  What increases the risk?  The risk to get an abscess is higher in males. It is also more likely in people who:  Have dental decay.  Have very bad gum disease.  Eat sugary snacks between meals.  Use tobacco.  Have diabetes.  Have a weak disease-fighting system (immune system).  Do not brush their teeth regularly.  What are the signs or symptoms?  Some mild symptoms are:  Tenderness.  Bad breath.  Fever.  A sharp, sour taste in the mouth.  Pain in and around the infected tooth.  Worse symptoms of this condition include:  Swollen neck glands.  Chills.  Pus draining around the tooth.  Swelling and redness around the tooth, the mouth, or the face.  Very bad pain in and around the tooth.  The worst symptoms can include:  Difficulty swallowing.  Difficulty opening your mouth.  Feeling like you may vomit or vomiting.  How is this treated?  This is treated by getting rid of the infection. Your dentist will discuss ways to do this, including:  Antibiotic medicines.  Antibacterial mouth rinse.  An incision in the abscess to drain out the pus.  A root canal.  Removing the tooth.  Follow these instructions at home:  Medicines    Take over-the-counter and prescription medicines only as told by your dentist.  If you were prescribed an antibiotic medicine, take it as told by your dentist. Do not stop taking it even if you start to feel better.  If you were prescribed a gel that has numbing medicine in it, use it exactly as told.  Ask your dentist if you should avoid driving or using machines while you are taking your medicine.  General instructions    Rinse your mouth often with salt water. To make salt water, dissolve ½–1 tsp (3–6 g) of salt in 1 cup (237 mL) of warm water.  Eat a soft diet while your mouth is healing.  Drink enough fluid to keep your pee (urine) pale yellow.  Do not apply heat to the outside of your mouth.  Do not smoke or use any products that contain nicotine or tobacco. If you need help quitting, ask your dentist.  Keep all follow-up visits.  Prevent an abscess    Brush your teeth every morning and every night. Use fluoride toothpaste.  Floss your teeth each day.  Get dental cleanings as often as told by your dentist.  Think about getting dental sealant put on teeth that have deep holes (decay).  Drink water that has fluoride in it.  Most tap water has fluoride.  Check the label on bottled water to see if it has fluoride in it.  Drink water instead of sugary drinks.  Eat healthy meals and snacks.  Wear a mouth guard or face shield when you play sports.  Contact a doctor if:  Your pain is worse and medicine does not help.  Get help right away if:  You have a fever or chills.  Your symptoms suddenly get worse.  You have a very bad headache.  You have problems breathing or swallowing.  You have trouble opening your mouth.  You have swelling in your neck or close to your eye.  These symptoms may be an emergency. Get help right away. Call your local emergency services (911 in the [place]  Do not wait to see if the symptoms will go away.  Do not drive yourself to the hospital.  Summary  A dental abscess is an area of pus in or around a tooth. It is caused by an infection.  Treatment will help with symptoms and prevent the infection from spreading.  Take over-the-counter and prescription medicines only as told by your dentist.  To prevent an abscess, take good care of your teeth. Brush your teeth every morning and night. Use floss every day.  Get dental cleanings as often as told by your dentist.  This information is not intended to replace advice given to you by your health care provider. Make sure you discuss any questions you have with your health care provider.

## 2024-08-14 NOTE — ED PROVIDER NOTE - PROGRESS NOTE DETAILS
Mauricio DO: Received sign out from Dr. CHON Nolan.  Informed pt with oral infection. Clinda administered.  CT reported Limited evaluation due to dental amalgam artifact. Multiple periapical   lucencies without evidence of soft tissue abscess.  RX PO clinda, Pt instructed to f/u with dentist. I will also provide cotnact for [facility] dental [facility].   Pt is well appearing, has no new complaints and able to walk with normal gait. Pt is stable for discharge and follow up with medical doctor. Pt educated on care and need for follow up. Discussed anticipatory guidance and return precautions. Questions answered. I had a detailed discussion with the patient regarding the historical points, exam findings, and any diagnostic results supporting the discharge diagnosis.

## 2024-08-14 NOTE — ED PROVIDER NOTE - NSFOLLOWUPCLINICS_GEN_ALL_ED_FT
[facility]  Dental  [address] [place] [id number]  Phone: [phone]  Fax: